# Patient Record
Sex: FEMALE | Race: WHITE | NOT HISPANIC OR LATINO
[De-identification: names, ages, dates, MRNs, and addresses within clinical notes are randomized per-mention and may not be internally consistent; named-entity substitution may affect disease eponyms.]

---

## 2017-06-23 ENCOUNTER — APPOINTMENT (OUTPATIENT)
Dept: OPHTHALMOLOGY | Facility: CLINIC | Age: 57
End: 2017-06-23

## 2017-09-21 ENCOUNTER — RESULT REVIEW (OUTPATIENT)
Age: 57
End: 2017-09-21

## 2018-04-18 ENCOUNTER — APPOINTMENT (OUTPATIENT)
Dept: OPHTHALMOLOGY | Facility: CLINIC | Age: 58
End: 2018-04-18
Payer: COMMERCIAL

## 2018-04-18 DIAGNOSIS — H18.603 KERATOCONUS, UNSPECIFIED, BILATERAL: ICD-10-CM

## 2018-04-18 DIAGNOSIS — H40.053 OCULAR HYPERTENSION, BILATERAL: ICD-10-CM

## 2018-04-18 PROCEDURE — 76514 ECHO EXAM OF EYE THICKNESS: CPT

## 2018-04-18 PROCEDURE — 92025 CPTRIZED CORNEAL TOPOGRAPHY: CPT

## 2018-04-18 PROCEDURE — 92012 INTRM OPH EXAM EST PATIENT: CPT

## 2018-04-19 PROBLEM — H40.053 OCULAR HYPERTENSION, BILATERAL: Status: ACTIVE | Noted: 2017-06-23

## 2018-04-19 PROBLEM — H18.603 KERATOCONUS OF BOTH EYES: Status: ACTIVE | Noted: 2017-06-23

## 2018-10-04 ENCOUNTER — RESULT REVIEW (OUTPATIENT)
Age: 58
End: 2018-10-04

## 2019-11-01 ENCOUNTER — RESULT REVIEW (OUTPATIENT)
Age: 59
End: 2019-11-01

## 2020-04-26 ENCOUNTER — MESSAGE (OUTPATIENT)
Age: 60
End: 2020-04-26

## 2020-05-06 ENCOUNTER — APPOINTMENT (OUTPATIENT)
Dept: DISASTER EMERGENCY | Facility: CLINIC | Age: 60
End: 2020-05-06

## 2020-05-07 LAB
SARS-COV-2 IGG SERPL IA-ACNC: <0.1 INDEX
SARS-COV-2 IGG SERPL QL IA: NEGATIVE

## 2020-11-24 ENCOUNTER — RESULT REVIEW (OUTPATIENT)
Age: 60
End: 2020-11-24

## 2020-12-01 ENCOUNTER — APPOINTMENT (OUTPATIENT)
Dept: INFUSION THERAPY | Facility: HOSPITAL | Age: 60
End: 2020-12-01

## 2020-12-02 ENCOUNTER — LABORATORY RESULT (OUTPATIENT)
Age: 60
End: 2020-12-02

## 2021-09-21 ENCOUNTER — APPOINTMENT (OUTPATIENT)
Dept: HEMATOLOGY ONCOLOGY | Facility: CLINIC | Age: 61
End: 2021-09-21

## 2021-09-21 DIAGNOSIS — Z00.00 ENCOUNTER FOR GENERAL ADULT MEDICAL EXAMINATION W/OUT ABNORMAL FINDINGS: ICD-10-CM

## 2021-09-23 LAB — SARS-COV-2 N GENE NPH QL NAA+PROBE: NOT DETECTED

## 2021-11-30 ENCOUNTER — RESULT REVIEW (OUTPATIENT)
Age: 61
End: 2021-11-30

## 2022-06-10 ENCOUNTER — APPOINTMENT (OUTPATIENT)
Dept: OPHTHALMOLOGY | Facility: CLINIC | Age: 62
End: 2022-06-10
Payer: COMMERCIAL

## 2022-06-10 ENCOUNTER — NON-APPOINTMENT (OUTPATIENT)
Age: 62
End: 2022-06-10

## 2022-06-10 PROCEDURE — 92025 CPTRIZED CORNEAL TOPOGRAPHY: CPT

## 2022-06-10 PROCEDURE — 92004 COMPRE OPH EXAM NEW PT 1/>: CPT

## 2022-12-09 ENCOUNTER — APPOINTMENT (OUTPATIENT)
Dept: OBGYN | Facility: CLINIC | Age: 62
End: 2022-12-09

## 2022-12-09 PROCEDURE — 81002 URINALYSIS NONAUTO W/O SCOPE: CPT

## 2022-12-09 PROCEDURE — 82270 OCCULT BLOOD FECES: CPT

## 2022-12-09 PROCEDURE — 99396 PREV VISIT EST AGE 40-64: CPT

## 2023-09-17 ENCOUNTER — EMERGENCY (EMERGENCY)
Facility: HOSPITAL | Age: 63
LOS: 1 days | Discharge: HOME | End: 2023-09-17
Attending: EMERGENCY MEDICINE
Payer: COMMERCIAL

## 2023-09-17 VITALS
OXYGEN SATURATION: 100 % | DIASTOLIC BLOOD PRESSURE: 92 MMHG | SYSTOLIC BLOOD PRESSURE: 186 MMHG | TEMPERATURE: 99 F | HEART RATE: 96 BPM | RESPIRATION RATE: 18 BRPM | WEIGHT: 229.94 LBS | HEIGHT: 68 IN

## 2023-09-17 VITALS
TEMPERATURE: 99 F | HEART RATE: 81 BPM | OXYGEN SATURATION: 98 % | DIASTOLIC BLOOD PRESSURE: 79 MMHG | SYSTOLIC BLOOD PRESSURE: 132 MMHG | RESPIRATION RATE: 18 BRPM

## 2023-09-17 LAB
ALBUMIN SERPL ELPH-MCNC: 3.5 G/DL — SIGNIFICANT CHANGE UP (ref 3.3–5)
ALBUMIN SERPL ELPH-MCNC: 3.5 G/DL — SIGNIFICANT CHANGE UP (ref 3.3–5)
ALP SERPL-CCNC: 92 U/L — SIGNIFICANT CHANGE UP (ref 40–120)
ALP SERPL-CCNC: 92 U/L — SIGNIFICANT CHANGE UP (ref 40–120)
ALT FLD-CCNC: 17 U/L — SIGNIFICANT CHANGE UP (ref 10–45)
ALT FLD-CCNC: 17 U/L — SIGNIFICANT CHANGE UP (ref 10–45)
ANION GAP SERPL CALC-SCNC: 12 MMOL/L — SIGNIFICANT CHANGE UP (ref 5–17)
AST SERPL-CCNC: 38 U/L — SIGNIFICANT CHANGE UP (ref 10–40)
AST SERPL-CCNC: 38 U/L — SIGNIFICANT CHANGE UP (ref 10–40)
BASE EXCESS BLDV CALC-SCNC: -0.2 MMOL/L — SIGNIFICANT CHANGE UP (ref -2–3)
BASE EXCESS BLDV CALC-SCNC: -0.2 MMOL/L — SIGNIFICANT CHANGE UP (ref -2–3)
BASOPHILS # BLD AUTO: 0.05 K/UL — SIGNIFICANT CHANGE UP (ref 0–0.2)
BASOPHILS # BLD AUTO: 0.05 K/UL — SIGNIFICANT CHANGE UP (ref 0–0.2)
BASOPHILS NFR BLD AUTO: 0.5 % — SIGNIFICANT CHANGE UP (ref 0–2)
BASOPHILS NFR BLD AUTO: 0.5 % — SIGNIFICANT CHANGE UP (ref 0–2)
BILIRUB SERPL-MCNC: 0.4 MG/DL — SIGNIFICANT CHANGE UP (ref 0.2–1.2)
BILIRUB SERPL-MCNC: 0.4 MG/DL — SIGNIFICANT CHANGE UP (ref 0.2–1.2)
BUN SERPL-MCNC: 24 MG/DL — HIGH (ref 7–23)
BUN SERPL-MCNC: 24 MG/DL — HIGH (ref 7–23)
BUN SERPL-MCNC: 26 MG/DL — HIGH (ref 7–23)
BUN SERPL-MCNC: 26 MG/DL — HIGH (ref 7–23)
CA-I SERPL-SCNC: 1.16 MMOL/L — SIGNIFICANT CHANGE UP (ref 1.15–1.33)
CA-I SERPL-SCNC: 1.16 MMOL/L — SIGNIFICANT CHANGE UP (ref 1.15–1.33)
CALCIUM SERPL-MCNC: 8.5 MG/DL — SIGNIFICANT CHANGE UP (ref 8.4–10.5)
CALCIUM SERPL-MCNC: 8.5 MG/DL — SIGNIFICANT CHANGE UP (ref 8.4–10.5)
CALCIUM SERPL-MCNC: 9.3 MG/DL — SIGNIFICANT CHANGE UP (ref 8.4–10.5)
CALCIUM SERPL-MCNC: 9.3 MG/DL — SIGNIFICANT CHANGE UP (ref 8.4–10.5)
CHLORIDE BLDV-SCNC: 106 MMOL/L — SIGNIFICANT CHANGE UP (ref 96–108)
CHLORIDE BLDV-SCNC: 106 MMOL/L — SIGNIFICANT CHANGE UP (ref 96–108)
CHLORIDE SERPL-SCNC: 104 MMOL/L — SIGNIFICANT CHANGE UP (ref 96–108)
CHLORIDE SERPL-SCNC: 104 MMOL/L — SIGNIFICANT CHANGE UP (ref 96–108)
CHLORIDE SERPL-SCNC: 108 MMOL/L — SIGNIFICANT CHANGE UP (ref 96–108)
CHLORIDE SERPL-SCNC: 108 MMOL/L — SIGNIFICANT CHANGE UP (ref 96–108)
CO2 BLDV-SCNC: 29 MMOL/L — HIGH (ref 22–26)
CO2 BLDV-SCNC: 29 MMOL/L — HIGH (ref 22–26)
CO2 SERPL-SCNC: 19 MMOL/L — LOW (ref 22–31)
CO2 SERPL-SCNC: 19 MMOL/L — LOW (ref 22–31)
CO2 SERPL-SCNC: 22 MMOL/L — SIGNIFICANT CHANGE UP (ref 22–31)
CO2 SERPL-SCNC: 22 MMOL/L — SIGNIFICANT CHANGE UP (ref 22–31)
CREAT SERPL-MCNC: 0.89 MG/DL — SIGNIFICANT CHANGE UP (ref 0.5–1.3)
CREAT SERPL-MCNC: 0.89 MG/DL — SIGNIFICANT CHANGE UP (ref 0.5–1.3)
CREAT SERPL-MCNC: 0.96 MG/DL — SIGNIFICANT CHANGE UP (ref 0.5–1.3)
CREAT SERPL-MCNC: 0.96 MG/DL — SIGNIFICANT CHANGE UP (ref 0.5–1.3)
CRP SERPL-MCNC: 27 MG/L — HIGH (ref 0–4)
CRP SERPL-MCNC: 27 MG/L — HIGH (ref 0–4)
EGFR: 66 ML/MIN/1.73M2 — SIGNIFICANT CHANGE UP
EGFR: 66 ML/MIN/1.73M2 — SIGNIFICANT CHANGE UP
EGFR: 73 ML/MIN/1.73M2 — SIGNIFICANT CHANGE UP
EGFR: 73 ML/MIN/1.73M2 — SIGNIFICANT CHANGE UP
EOSINOPHIL # BLD AUTO: 0.1 K/UL — SIGNIFICANT CHANGE UP (ref 0–0.5)
EOSINOPHIL # BLD AUTO: 0.1 K/UL — SIGNIFICANT CHANGE UP (ref 0–0.5)
EOSINOPHIL NFR BLD AUTO: 1.1 % — SIGNIFICANT CHANGE UP (ref 0–6)
EOSINOPHIL NFR BLD AUTO: 1.1 % — SIGNIFICANT CHANGE UP (ref 0–6)
ERYTHROCYTE [SEDIMENTATION RATE] IN BLOOD: 82 MM/HR — HIGH (ref 0–20)
ERYTHROCYTE [SEDIMENTATION RATE] IN BLOOD: 82 MM/HR — HIGH (ref 0–20)
GAS PNL BLDV: 136 MMOL/L — SIGNIFICANT CHANGE UP (ref 136–145)
GAS PNL BLDV: 136 MMOL/L — SIGNIFICANT CHANGE UP (ref 136–145)
GAS PNL BLDV: SIGNIFICANT CHANGE UP
GLUCOSE BLDV-MCNC: 115 MG/DL — HIGH (ref 70–99)
GLUCOSE BLDV-MCNC: 115 MG/DL — HIGH (ref 70–99)
GLUCOSE SERPL-MCNC: 120 MG/DL — HIGH (ref 70–99)
GLUCOSE SERPL-MCNC: 120 MG/DL — HIGH (ref 70–99)
GLUCOSE SERPL-MCNC: 138 MG/DL — HIGH (ref 70–99)
GLUCOSE SERPL-MCNC: 138 MG/DL — HIGH (ref 70–99)
HCO3 BLDV-SCNC: 27 MMOL/L — SIGNIFICANT CHANGE UP (ref 22–29)
HCO3 BLDV-SCNC: 27 MMOL/L — SIGNIFICANT CHANGE UP (ref 22–29)
HCT VFR BLD CALC: 38.3 % — SIGNIFICANT CHANGE UP (ref 34.5–45)
HCT VFR BLD CALC: 38.3 % — SIGNIFICANT CHANGE UP (ref 34.5–45)
HCT VFR BLDA CALC: 38 % — SIGNIFICANT CHANGE UP (ref 34.5–46.5)
HCT VFR BLDA CALC: 38 % — SIGNIFICANT CHANGE UP (ref 34.5–46.5)
HGB BLD CALC-MCNC: 12.5 G/DL — SIGNIFICANT CHANGE UP (ref 11.7–16.1)
HGB BLD CALC-MCNC: 12.5 G/DL — SIGNIFICANT CHANGE UP (ref 11.7–16.1)
HGB BLD-MCNC: 12.3 G/DL — SIGNIFICANT CHANGE UP (ref 11.5–15.5)
HGB BLD-MCNC: 12.3 G/DL — SIGNIFICANT CHANGE UP (ref 11.5–15.5)
IMM GRANULOCYTES NFR BLD AUTO: 0.3 % — SIGNIFICANT CHANGE UP (ref 0–0.9)
IMM GRANULOCYTES NFR BLD AUTO: 0.3 % — SIGNIFICANT CHANGE UP (ref 0–0.9)
LACTATE BLDV-MCNC: 2.1 MMOL/L — HIGH (ref 0.5–2)
LACTATE BLDV-MCNC: 2.1 MMOL/L — HIGH (ref 0.5–2)
LYMPHOCYTES # BLD AUTO: 0.86 K/UL — LOW (ref 1–3.3)
LYMPHOCYTES # BLD AUTO: 0.86 K/UL — LOW (ref 1–3.3)
LYMPHOCYTES # BLD AUTO: 9.3 % — LOW (ref 13–44)
LYMPHOCYTES # BLD AUTO: 9.3 % — LOW (ref 13–44)
MCHC RBC-ENTMCNC: 28.6 PG — SIGNIFICANT CHANGE UP (ref 27–34)
MCHC RBC-ENTMCNC: 28.6 PG — SIGNIFICANT CHANGE UP (ref 27–34)
MCHC RBC-ENTMCNC: 32.1 GM/DL — SIGNIFICANT CHANGE UP (ref 32–36)
MCHC RBC-ENTMCNC: 32.1 GM/DL — SIGNIFICANT CHANGE UP (ref 32–36)
MCV RBC AUTO: 89.1 FL — SIGNIFICANT CHANGE UP (ref 80–100)
MCV RBC AUTO: 89.1 FL — SIGNIFICANT CHANGE UP (ref 80–100)
MONOCYTES # BLD AUTO: 0.38 K/UL — SIGNIFICANT CHANGE UP (ref 0–0.9)
MONOCYTES # BLD AUTO: 0.38 K/UL — SIGNIFICANT CHANGE UP (ref 0–0.9)
MONOCYTES NFR BLD AUTO: 4.1 % — SIGNIFICANT CHANGE UP (ref 2–14)
MONOCYTES NFR BLD AUTO: 4.1 % — SIGNIFICANT CHANGE UP (ref 2–14)
NEUTROPHILS # BLD AUTO: 7.78 K/UL — HIGH (ref 1.8–7.4)
NEUTROPHILS # BLD AUTO: 7.78 K/UL — HIGH (ref 1.8–7.4)
NEUTROPHILS NFR BLD AUTO: 84.7 % — HIGH (ref 43–77)
NEUTROPHILS NFR BLD AUTO: 84.7 % — HIGH (ref 43–77)
NRBC # BLD: 0 /100 WBCS — SIGNIFICANT CHANGE UP (ref 0–0)
NRBC # BLD: 0 /100 WBCS — SIGNIFICANT CHANGE UP (ref 0–0)
PCO2 BLDV: 55 MMHG — HIGH (ref 39–42)
PCO2 BLDV: 55 MMHG — HIGH (ref 39–42)
PH BLDV: 7.3 — LOW (ref 7.32–7.43)
PH BLDV: 7.3 — LOW (ref 7.32–7.43)
PLATELET # BLD AUTO: 208 K/UL — SIGNIFICANT CHANGE UP (ref 150–400)
PLATELET # BLD AUTO: 208 K/UL — SIGNIFICANT CHANGE UP (ref 150–400)
PO2 BLDV: 33 MMHG — SIGNIFICANT CHANGE UP (ref 25–45)
PO2 BLDV: 33 MMHG — SIGNIFICANT CHANGE UP (ref 25–45)
POTASSIUM BLDV-SCNC: 6 MMOL/L — HIGH (ref 3.5–5.1)
POTASSIUM BLDV-SCNC: 6 MMOL/L — HIGH (ref 3.5–5.1)
POTASSIUM SERPL-MCNC: 4.6 MMOL/L — SIGNIFICANT CHANGE UP (ref 3.5–5.3)
POTASSIUM SERPL-MCNC: 4.6 MMOL/L — SIGNIFICANT CHANGE UP (ref 3.5–5.3)
POTASSIUM SERPL-MCNC: 5.8 MMOL/L — HIGH (ref 3.5–5.3)
POTASSIUM SERPL-MCNC: 5.8 MMOL/L — HIGH (ref 3.5–5.3)
POTASSIUM SERPL-SCNC: 4.6 MMOL/L — SIGNIFICANT CHANGE UP (ref 3.5–5.3)
POTASSIUM SERPL-SCNC: 4.6 MMOL/L — SIGNIFICANT CHANGE UP (ref 3.5–5.3)
POTASSIUM SERPL-SCNC: 5.8 MMOL/L — HIGH (ref 3.5–5.3)
POTASSIUM SERPL-SCNC: 5.8 MMOL/L — HIGH (ref 3.5–5.3)
PROT SERPL-MCNC: 7.4 G/DL — SIGNIFICANT CHANGE UP (ref 6–8.3)
PROT SERPL-MCNC: 7.4 G/DL — SIGNIFICANT CHANGE UP (ref 6–8.3)
RBC # BLD: 4.3 M/UL — SIGNIFICANT CHANGE UP (ref 3.8–5.2)
RBC # BLD: 4.3 M/UL — SIGNIFICANT CHANGE UP (ref 3.8–5.2)
RBC # FLD: 12.3 % — SIGNIFICANT CHANGE UP (ref 10.3–14.5)
RBC # FLD: 12.3 % — SIGNIFICANT CHANGE UP (ref 10.3–14.5)
SAO2 % BLDV: 48.8 % — LOW (ref 67–88)
SAO2 % BLDV: 48.8 % — LOW (ref 67–88)
SODIUM SERPL-SCNC: 138 MMOL/L — SIGNIFICANT CHANGE UP (ref 135–145)
SODIUM SERPL-SCNC: 138 MMOL/L — SIGNIFICANT CHANGE UP (ref 135–145)
SODIUM SERPL-SCNC: 139 MMOL/L — SIGNIFICANT CHANGE UP (ref 135–145)
SODIUM SERPL-SCNC: 139 MMOL/L — SIGNIFICANT CHANGE UP (ref 135–145)
WBC # BLD: 9.2 K/UL — SIGNIFICANT CHANGE UP (ref 3.8–10.5)
WBC # BLD: 9.2 K/UL — SIGNIFICANT CHANGE UP (ref 3.8–10.5)
WBC # FLD AUTO: 9.2 K/UL — SIGNIFICANT CHANGE UP (ref 3.8–10.5)
WBC # FLD AUTO: 9.2 K/UL — SIGNIFICANT CHANGE UP (ref 3.8–10.5)

## 2023-09-17 PROCEDURE — 85025 COMPLETE CBC W/AUTO DIFF WBC: CPT

## 2023-09-17 PROCEDURE — 73610 X-RAY EXAM OF ANKLE: CPT

## 2023-09-17 PROCEDURE — 73610 X-RAY EXAM OF ANKLE: CPT | Mod: 26,RT

## 2023-09-17 PROCEDURE — 83605 ASSAY OF LACTIC ACID: CPT

## 2023-09-17 PROCEDURE — 82947 ASSAY GLUCOSE BLOOD QUANT: CPT

## 2023-09-17 PROCEDURE — 99285 EMERGENCY DEPT VISIT HI MDM: CPT

## 2023-09-17 PROCEDURE — 99284 EMERGENCY DEPT VISIT MOD MDM: CPT | Mod: 25

## 2023-09-17 PROCEDURE — 80053 COMPREHEN METABOLIC PANEL: CPT

## 2023-09-17 PROCEDURE — 82803 BLOOD GASES ANY COMBINATION: CPT

## 2023-09-17 PROCEDURE — 85014 HEMATOCRIT: CPT

## 2023-09-17 PROCEDURE — 84295 ASSAY OF SERUM SODIUM: CPT

## 2023-09-17 PROCEDURE — 73630 X-RAY EXAM OF FOOT: CPT | Mod: 26,RT

## 2023-09-17 PROCEDURE — 96374 THER/PROPH/DIAG INJ IV PUSH: CPT

## 2023-09-17 PROCEDURE — 84132 ASSAY OF SERUM POTASSIUM: CPT

## 2023-09-17 PROCEDURE — 80048 BASIC METABOLIC PNL TOTAL CA: CPT

## 2023-09-17 PROCEDURE — 96375 TX/PRO/DX INJ NEW DRUG ADDON: CPT

## 2023-09-17 PROCEDURE — 86140 C-REACTIVE PROTEIN: CPT

## 2023-09-17 PROCEDURE — 85652 RBC SED RATE AUTOMATED: CPT

## 2023-09-17 PROCEDURE — 73630 X-RAY EXAM OF FOOT: CPT

## 2023-09-17 PROCEDURE — 85018 HEMOGLOBIN: CPT

## 2023-09-17 PROCEDURE — 82330 ASSAY OF CALCIUM: CPT

## 2023-09-17 PROCEDURE — 82435 ASSAY OF BLOOD CHLORIDE: CPT

## 2023-09-17 RX ORDER — SODIUM CHLORIDE 9 MG/ML
1000 INJECTION INTRAMUSCULAR; INTRAVENOUS; SUBCUTANEOUS ONCE
Refills: 0 | Status: COMPLETED | OUTPATIENT
Start: 2023-09-17 | End: 2023-09-17

## 2023-09-17 RX ORDER — COLCHICINE 0.6 MG
0.6 TABLET ORAL ONCE
Refills: 0 | Status: COMPLETED | OUTPATIENT
Start: 2023-09-17 | End: 2023-09-17

## 2023-09-17 RX ORDER — ACETAMINOPHEN 500 MG
1000 TABLET ORAL ONCE
Refills: 0 | Status: COMPLETED | OUTPATIENT
Start: 2023-09-17 | End: 2023-09-17

## 2023-09-17 RX ORDER — CEPHALEXIN 500 MG
1 CAPSULE ORAL
Qty: 21 | Refills: 0
Start: 2023-09-17 | End: 2023-09-23

## 2023-09-17 RX ORDER — CEFAZOLIN SODIUM 1 G
1000 VIAL (EA) INJECTION ONCE
Refills: 0 | Status: COMPLETED | OUTPATIENT
Start: 2023-09-17 | End: 2023-09-17

## 2023-09-17 RX ADMIN — Medication 100 MILLIGRAM(S): at 10:57

## 2023-09-17 RX ADMIN — SODIUM CHLORIDE 1000 MILLILITER(S): 9 INJECTION INTRAMUSCULAR; INTRAVENOUS; SUBCUTANEOUS at 10:56

## 2023-09-17 RX ADMIN — Medication 1000 MILLIGRAM(S): at 12:00

## 2023-09-17 RX ADMIN — Medication 400 MILLIGRAM(S): at 10:56

## 2023-09-17 NOTE — ED ADULT NURSE NOTE - NSFALLUNIVINTERV_ED_ALL_ED
Bed/Stretcher in lowest position, wheels locked, appropriate side rails in place/Call bell, personal items and telephone in reach/Instruct patient to call for assistance before getting out of bed/chair/stretcher/Non-slip footwear applied when patient is off stretcher/[place] to call system/Physically safe environment - no spills, clutter or unnecessary equipment/Purposeful proactive rounding/Room/bathroom lighting operational, light cord in reach Bed/Stretcher in lowest position, wheels locked, appropriate side rails in place/Call bell, personal items and telephone in reach/Instruct patient to call for assistance before getting out of bed/chair/stretcher/Non-slip footwear applied when patient is off stretcher/[place] to call system/Physically safe environment - no spills, clutter or unnecessary equipment/Purposeful proactive rounding/Room/bathroom lighting operational, light cord in reach

## 2023-09-17 NOTE — ED PROVIDER NOTE - PROGRESS NOTE DETAILS
Radha Perez M.D. (Resident Physician): Podiatry saw pt and thinks this is gout and no infection. Blood work non-actionable. Pt does not want to stay in CDU for IV abx. Will dc with keflex and f/u tmrw. Radha Perez M.D. (Resident Physician): Podiatry saw pt and thinks this is gout and no infection. Blood work non-actionable. Discussed CDU disposition for IV abx, fever monitoring and repeat labs. Pt would not want to stay in CDU. Will dc with keflex and f/u w her pod tmrw. Return precautions discussed.

## 2023-09-17 NOTE — ED PROVIDER NOTE - CLINICAL SUMMARY MEDICAL DECISION MAKING FREE TEXT BOX
63 female past medical history hypertension, hyperlipidemia, focal segmental glomerulosclerosis, gout presenting with right foot pain. Febrile at 100.4F. DDx includes but not limited to: for foot pain with ambulation - soft tissue injury vs fx; for 1st toe pain - gout vs cellulitis, septic joint less likely given normal rom, no open wounds and no DM. Plan: blood work, xray, ofirmev, ancef, IVF. Will re-assess. Likely podiatry c/s. 63 female past medical history hypertension, hyperlipidemia, focal segmental glomerulosclerosis, gout presenting with right foot pain across dorsal arch of foot and at 1st MTP joint. In ED Febrile at 100.4F. On exam R foot w localized erythema to 1st MTP joint a mild erythema and warmth, FROM at MTP joint. Also w some mild warmth across dorsal arch w/o erythema. No lesions present. Ankle and remainder of leg wnl. Palp DPs. Very low suspicion for septic joint. More likley acute gouty arthritis vs localized cellulitis. Plan: blood work, xray, ofirmev, ancef, IVF. Will re-assess. Likely podiatry c/s.

## 2023-09-17 NOTE — ED ADULT NURSE NOTE - OBJECTIVE STATEMENT
64yo F aaox4 h/o HTN, HLD, kidneys disease, presents ambulatory to ED from home with  at the bedside, as per pt last Sunday had a numbness on the R foot , "trying to wake up the R  foot, hit R foot against the floor", since last Monday pt reports having a R foot pain and small area: redness, pt is concern that on 8/1/23 had gout in the other foot, on examination R foot: +pulses, arm to touch, , 2 sec cap refill, +ROM, +sensation, denies trauma/injury Pt denies CP, SOB, HA, vision changes, n/v/d, fevers chills, weakness, URI symptoms Safety and comfort measures initiated- bed placed in lowest position and side rails raised. Pt oriented to call bell system. 62yo F aaox4 h/o HTN, HLD, kidneys disease, presents ambulatory to ED from home with  at the bedside, as per pt last Sunday had a numbness on the R foot , "trying to wake up the R  foot, hit R foot against the floor", since last Monday pt reports having a R foot pain and small area: redness, pt is concern that on 8/1/23 had gout in the other foot, on examination R foot: +pulses, arm to touch, , 2 sec cap refill, +ROM, +sensation, denies trauma/injury Pt denies CP, SOB, HA, vision changes, n/v/d, fevers chills, weakness, URI symptoms Safety and comfort measures initiated- bed placed in lowest position and side rails raised. Pt oriented to call bell system.

## 2023-09-17 NOTE — ED PROVIDER NOTE - DIFFERENTIAL DIAGNOSIS
DDx includes but not limited to:  soft tissue injury vs fx; gout vs cellulitis, septic joint less likely given normal rom, no open wounds and no DM. Differential Diagnosis

## 2023-09-17 NOTE — ED PROVIDER NOTE - NSFOLLOWUPINSTRUCTIONS_ED_ALL_ED_FT
You have been evaluated in the Emergency Department today for right foot pain. You likely have gout.    Please  and take the antibiotics as prescribed.    For pain, you can take TYLENOL/ACETAMINOPHEN up to 4,000mg a day for your symptoms in four divided doses.    Please follow-up with your primary care doctor and podiatrist tomorrow.    Return to the Emergency Department if you experience worsening or uncontrolled pain, continued fevers 100.4°F or greater, or any other concerning symptoms.    Thank you for choosing us for your care.

## 2023-09-17 NOTE — ED PROVIDER NOTE - PATIENT PORTAL LINK FT
You can access the FollowMyHealth Patient Portal offered by [facility] by registering at the following website: [email or web address]. By joining [email or web address]’s FollowMyHealth portal, you will also be able to view your health information using other applications (apps) compatible with our system. You can access the FollowMyHealth Patient Portal offered by [facility] by registering at the following website: [email or web address]. By joining [employer]’s FollowMyHealth portal, you will also be able to view your health information using other applications (apps) compatible with our system.

## 2023-09-17 NOTE — ED PROVIDER NOTE - OBJECTIVE STATEMENT
63 female past medical history hypertension, hyperlipidemia, focal segmental glomerulosclerosis, gout presenting with right foot pain.   at bedside for collateral.  Patient says that 8 days ago she was stomping her right foot on the ground after she had some numbness from sitting in an awkward position.  Since then she has had pain over the anterior aspect of her foot with ambulation, denies any tenderness to palpation over the anterior foot.  Then 2 days ago patient started having redness and pain over the right first metatarsal phalangeal joint.  Patient had gout in the exact same spot on the left side before.  Says that this presentation is similar to her prior gout.  Denies any fever, weakness, numbness, tingling, chest pain, shortness of breath, cough, throat pain, abdominal pain, urinary symptoms, diarrhea, constipation.  Last took Tylenol yesterday.

## 2023-09-17 NOTE — CONSULT NOTE ADULT - SUBJECTIVE AND OBJECTIVE BOX
JAY MCDONALD  [id number] 63yF   --------------------------------------    · Chief Complaint: The patient is a 63y Female complaining of pain, foot.  · HPI Objective Statement: 63 female past medical history hypertension, hyperlipidemia, focal segmental glomerulosclerosis, gout presenting with right foot pain.   at bedside for collateral.  Patient says that 8 days ago she was stomping her right foot on the ground after she had some numbness from sitting in an awkward position.  Since then she has had pain over the anterior aspect of her foot with ambulation, denies any tenderness to palpation over the anterior foot.  Then 2 days ago patient started having redness and pain over the right first metatarsal phalangeal joint.  Patient had gout in the exact same spot on the left side before.  Says that this presentation is similar to her prior gout.  Denies any fever, weakness, numbness, tingling, chest pain, shortness of breath, cough, throat pain, abdominal pain, urinary symptoms, diarrhea, constipation.  Last took Tylenol yesterday.          PAST MEDICAL & SURGICAL HISTORY:      MEDICATIONS  (STANDING):    MEDICATIONS  (PRN):      Allergies    No Known Allergies    Intolerances        --------------------------------------  VITALS:    Vital Signs Last 24 Hrs  T(C): 37.4 (17 Sep 2023 12:10), Max: 38 (17 Sep 2023 10:35)  T(F): 99.3 (17 Sep 2023 12:10), Max: 100.4 (17 Sep 2023 10:35)  HR: 90 (17 Sep 2023 12:10) (90 - 96)  BP: 173/72 (17 Sep 2023 12:10) (173/72 - 186/92)  BP(mean): --  RR: 18 (17 Sep 2023 12:10) (18 - 18)  SpO2: 100% (17 Sep 2023 12:10) (100% - 100%)    Parameters below as of 17 Sep 2023 12:10  Patient On (Oxygen Delivery Method): room air        --------------------------------------  LABS:                          12.3   9.20  )-----------( 208      ( 17 Sep 2023 10:53 )             38.3       09-17    138  |  104  |  26<H>  ----------------------------<  120<H>  5.8<H>   |  22  |  0.96    Ca    9.3      17 Sep 2023 10:53    TPro  7.4  /  Alb  3.5  /  TBili  0.4  /  DBili  x   /  AST  38  /  ALT  17  /  AlkPhos  92  09-17      CAPILLARY BLOOD GLUCOSE              LOWER EXTREMITY PHYSICAL EXAM:    Vascular: DP1/4  PT 2/4, B/L, CFT < 3seconds B/L, Temperature gradient warm to cool , B/L.   Neuro: Epicritic sensation intact to the level of digits , B/L.  Musculoskeletal/Ortho: all digits neurovascular status intact intact, motor function normal, no limitation of ROM  Skin: no open wound, mild erythema with mild edema to R foot medial 1st MPJ area.     RADIOLOGY & ADDITIONAL STUDIES:      ACC: [id number] EXAM:  XR ANKLE COMP MIN 3 VIEWS RT   ORDERED BY:  REBEKAH LIM     ACC: [id number] EXAM:  XR FOOT COMP MIN 3 VIEWS RT   ORDERED BY:  REBEKAH LIM     PROCEDURE DATE:  09/17/2023          INTERPRETATION:  CLINICAL INDICATION: Pain over the anterior aspect of   foot, concern for fracture..    TECHNIQUE: 3 views of the right foot. 3 views of the right ankle.    COMPARISON: None available.    FINDINGS:    No acute fracture. No dislocation. Joint spaces are maintained. No joint   effusion. Moderate generalized swelling. Moderate plantar and   retrocalcaneal spurs.        IMPRESSION:  No acute fracture or dislocation.  Moderate generalized swelling.    --- End of Report ---           OSIRIS NICHOLS MD; Resident Radiologist  This document has been electronically signed.   SAMANTHA JOHANSEN DO; Attending Radiologist  This document has been electronically signed. Sep 17 2023 11:59AM   JAY MCDONALD  [id number] 63yF   --------------------------------------    · Chief Complaint: The patient is a 63y Female complaining of pain, foot.  · HPI Objective Statement: 63 female past medical history hypertension, hyperlipidemia, focal segmental glomerulosclerosis, gout presenting with right foot pain.   at bedside for collateral.  Patient says that 8 days ago she was stomping her right foot on the ground after she had some numbness from sitting in an awkward position.  Since then she has had pain over the anterior aspect of her foot with ambulation, denies any tenderness to palpation over the anterior foot.  Then 2 days ago patient started having redness and pain over the right first metatarsal phalangeal joint.  Patient had gout in the exact same spot on the left side before.  Says that this presentation is similar to her prior gout.  Denies any fever, weakness, numbness, tingling, chest pain, shortness of breath, cough, throat pain, abdominal pain, urinary symptoms, diarrhea, constipation.  Last took Tylenol yesterday.          PAST MEDICAL & SURGICAL HISTORY:      MEDICATIONS  (STANDING):    MEDICATIONS  (PRN):      Allergies    No Known Allergies    Intolerances        --------------------------------------  VITALS:    Vital Signs Last 24 Hrs  T(C): 37.4 (17 Sep 2023 12:10), Max: 38 (17 Sep 2023 10:35)  T(F): 99.3 (17 Sep 2023 12:10), Max: 100.4 (17 Sep 2023 10:35)  HR: 90 (17 Sep 2023 12:10) (90 - 96)  BP: 173/72 (17 Sep 2023 12:10) (173/72 - 186/92)  BP(mean): --  RR: 18 (17 Sep 2023 12:10) (18 - 18)  SpO2: 100% (17 Sep 2023 12:10) (100% - 100%)    Parameters below as of 17 Sep 2023 12:10  Patient On (Oxygen Delivery Method): room air        --------------------------------------  LABS:                          12.3   9.20  )-----------( 208      ( 17 Sep 2023 10:53 )             38.3       09-17    138  |  104  |  26<H>  ----------------------------<  120<H>  5.8<H>   |  22  |  0.96    Ca    9.3      17 Sep 2023 10:53    TPro  7.4  /  Alb  3.5  /  TBili  0.4  /  DBili  x   /  AST  38  /  ALT  17  /  AlkPhos  92  09-17      CAPILLARY BLOOD GLUCOSE              LOWER EXTREMITY PHYSICAL EXAM:    Vascular: DP1/4  PT 2/4, B/L, CFT < 3seconds B/L, Temperature gradient warm to cool , B/L.   Neuro: Epicritic sensation intact to the level of digits , B/L.  Musculoskeletal/Ortho: all digits neurovascular status intact intact, motor function normal, no limitation of ROM  Skin: no open wound, mild erythema with mild edema to R foot medial 1st MPJ area.     RADIOLOGY & ADDITIONAL STUDIES:      ACC: [id number] EXAM:  XR ANKLE COMP MIN 3 VIEWS RT   ORDERED BY:  REBEKAH LIM     ACC: [id number] EXAM:  XR FOOT COMP MIN 3 VIEWS RT   ORDERED BY:  REBEKAH LIM     PROCEDURE DATE:  09/17/2023          INTERPRETATION:  CLINICAL INDICATION: Pain over the anterior aspect of   foot, concern for fracture..    TECHNIQUE: 3 views of the right foot. 3 views of the right ankle.    COMPARISON: None available.    FINDINGS:    No acute fracture. No dislocation. Joint spaces are maintained. No joint   effusion. Moderate generalized swelling. Moderate plantar and   retrocalcaneal spurs.        IMPRESSION:  No acute fracture or dislocation.  Moderate generalized swelling.    --- End of Report ---           OSIRIS NICHOLS MD; Resident Radiologist  This document has been electronically signed.   SAMANTHA JOHANSEN DO; Attending Radiologist  This document has been electronically signed. Sep 17 2023 11:59AM

## 2023-09-17 NOTE — CONSULT NOTE ADULT - ASSESSMENT
63y Female with R foot 1st MPJ pain 2/2 recent gout attack  - Patient seen and evaluated  - Afebrile, WBC 9.20, CRP 2.7  - no open wound, mild erythema with mild edema to R foot medial 1st MPJ area.   - R foot xray: no fracture no dislocation, moderate global swelling   - Patient stable for discharge and follow up outpatient with her original podiatrist Dr Juve Negro, patient can also follow up with Dr. Edward/ Maranda within ONE week via [phone] if any difficulty of following up with original podiatrist   - Rodriguez compression applied with Webril and Ace, neurovascular status of the R foot intact.  - weight bearing as tolerated in surgical shoes to R foot   - recommended one acute dose Colchicine upon dc.   - Discussed with attending. 63y Female with R foot 1st MPJ pain 2/2 recent gout attack  - Patient seen and evaluated  - Afebrile, WBC 9.20, CRP 2.7  - no open wound, mild erythema with mild edema to R foot medial 1st MPJ area.   - R foot xray: no fracture no dislocation, moderate global swelling   - Patient stable for discharge and follow up outpatient with her original podiatrist Dr Juve Negro, patient can also follow up with Dr. Edward/ Maranda within ONE week via [phone] if any difficulty of following up with original podiatrist   - Rodriguez compression applied with Webril and Ace, neurovascular status of the R foot intact.  - weight bearing as tolerated in surgical shoes to R foot   - recommended one acute dose Colchicine upon dc.   - Discussed with attending.

## 2023-09-17 NOTE — ED PROVIDER NOTE - PHYSICAL EXAMINATION
PHYSICAL EXAM:  GENERAL: Sitting comfortable in bed, in no acute distress  HENMT: Atraumatic, moist mucous membranes   EYES: Clear bilaterally, PERRL, EOMs intact b/l  HEART: Regular rate and regular rhythm, S1/S2, no murmur  RESPIRATORY: Clear to auscultation bilaterally, no wheezes/rhonchi/rales  ABDOMEN: Soft, nontender, nondistended  EXTREMITIES: No lower extremity edema, erythema and ttp over the R lateral 1st MTP joint, ankle and 1st toe with normal rom, +2 dorsalis pedis pulses b/l  NEURO: A&Ox4, no focal neuro deficits

## 2024-01-16 ENCOUNTER — APPOINTMENT (OUTPATIENT)
Dept: OBGYN | Facility: CLINIC | Age: 64
End: 2024-01-16

## 2024-01-16 ENCOUNTER — APPOINTMENT (OUTPATIENT)
Dept: OBGYN | Facility: CLINIC | Age: 64
End: 2024-01-16
Payer: COMMERCIAL

## 2024-01-16 PROCEDURE — 82270 OCCULT BLOOD FECES: CPT

## 2024-01-16 PROCEDURE — 99396 PREV VISIT EST AGE 40-64: CPT

## 2024-01-16 PROCEDURE — 81002 URINALYSIS NONAUTO W/O SCOPE: CPT

## 2024-02-01 ENCOUNTER — TRANSCRIPTION ENCOUNTER (OUTPATIENT)
Age: 64
End: 2024-02-01

## 2025-01-29 ENCOUNTER — APPOINTMENT (OUTPATIENT)
Dept: OBGYN | Facility: CLINIC | Age: 65
End: 2025-01-29
Payer: COMMERCIAL

## 2025-01-29 PROCEDURE — 99396 PREV VISIT EST AGE 40-64: CPT

## 2025-01-29 PROCEDURE — 99459 PELVIC EXAMINATION: CPT

## 2025-01-29 PROCEDURE — 81002 URINALYSIS NONAUTO W/O SCOPE: CPT

## 2025-08-13 ENCOUNTER — NON-APPOINTMENT (OUTPATIENT)
Age: 65
End: 2025-08-13

## 2025-08-13 ENCOUNTER — APPOINTMENT (OUTPATIENT)
Dept: OPHTHALMOLOGY | Facility: CLINIC | Age: 65
End: 2025-08-13
Payer: MEDICARE

## 2025-08-13 PROCEDURE — 92025 CPTRIZED CORNEAL TOPOGRAPHY: CPT

## 2025-08-13 PROCEDURE — 92004 COMPRE OPH EXAM NEW PT 1/>: CPT
